# Patient Record
Sex: MALE | Race: WHITE | NOT HISPANIC OR LATINO | URBAN - METROPOLITAN AREA
[De-identification: names, ages, dates, MRNs, and addresses within clinical notes are randomized per-mention and may not be internally consistent; named-entity substitution may affect disease eponyms.]

---

## 2020-12-29 ENCOUNTER — NURSE TRIAGE (OUTPATIENT)
Dept: OTHER | Facility: OTHER | Age: 35
End: 2020-12-29

## 2021-05-16 ENCOUNTER — IMMUNIZATIONS (OUTPATIENT)
Dept: FAMILY MEDICINE CLINIC | Facility: HOSPITAL | Age: 36
End: 2021-05-16

## 2021-05-16 DIAGNOSIS — Z23 ENCOUNTER FOR IMMUNIZATION: Primary | ICD-10-CM

## 2021-05-16 PROCEDURE — 91300 SARS-COV-2 / COVID-19 MRNA VACCINE (PFIZER-BIONTECH) 30 MCG: CPT

## 2021-05-16 PROCEDURE — 0001A SARS-COV-2 / COVID-19 MRNA VACCINE (PFIZER-BIONTECH) 30 MCG: CPT

## 2021-06-10 ENCOUNTER — IMMUNIZATIONS (OUTPATIENT)
Dept: FAMILY MEDICINE CLINIC | Facility: HOSPITAL | Age: 36
End: 2021-06-10

## 2021-06-10 DIAGNOSIS — Z23 ENCOUNTER FOR IMMUNIZATION: Primary | ICD-10-CM

## 2021-06-10 PROCEDURE — 0002A SARS-COV-2 / COVID-19 MRNA VACCINE (PFIZER-BIONTECH) 30 MCG: CPT

## 2021-06-10 PROCEDURE — 91300 SARS-COV-2 / COVID-19 MRNA VACCINE (PFIZER-BIONTECH) 30 MCG: CPT

## 2023-01-16 ENCOUNTER — TELEPHONE (OUTPATIENT)
Dept: UROLOGY | Facility: AMBULATORY SURGERY CENTER | Age: 38
End: 2023-01-16

## 2023-01-16 NOTE — TELEPHONE ENCOUNTER
New Patient    What is the reason for the patient’s appointment? Vas consult     What office location does the patient prefer? [place]    Imaging/Lab Results:    Do we accept the patient's insurance or is the patient Self-Pay? Yes     Insurance Provider: Hermelinda Hatchet   Plan Type/Number:  Member ID#: Has the patient had any previous Urologist(s)? No     Have patient records been requested? If not are records showing in [address] [place] [address] Rd: records in [facility]     Has the patient had any outside testing done? No     Does the patient have a personal history of cancer?  No

## 2023-02-07 ENCOUNTER — OFFICE VISIT (OUTPATIENT)
Dept: UROLOGY | Facility: HOSPITAL | Age: 38
End: 2023-02-07

## 2023-02-07 VITALS
HEART RATE: 65 BPM | BODY MASS INDEX: 28.41 KG/M2 | HEIGHT: 67 IN | DIASTOLIC BLOOD PRESSURE: 62 MMHG | OXYGEN SATURATION: 97 % | SYSTOLIC BLOOD PRESSURE: 100 MMHG | WEIGHT: 181 LBS

## 2023-02-07 DIAGNOSIS — Z30.2 ENCOUNTER FOR STERILIZATION: Primary | ICD-10-CM

## 2023-02-07 NOTE — PROGRESS NOTES
2/7/2023    Bertin Bryan  1985  [id number]    VASECTOMY CONSULT     30 y o  male managed by new patient    1  Desire for elective sterilization  - exam today as below  - informed consent signed today  - continue contraception  - shave scrotal/pubic hair day prior to appt date    Return for vasectomy as scheduled  Instructed patient to call 1 week prior to his procedure date if he wishes to have a benzo prescribed  He understands he will need a ride to and from the office  All questions answered     History of Present Illness  Bertin Bryan is a 30 y o  male here for evaluation of VASECTOMY CONSULT    History of genitourinary or groin trauma or surgery- no   Fathered children- yes, 2   Personal and/or mutual desire for permanent sterility- mutual   Current contraceptive method- spouse has an IUD  Work/manual labor/lifting- No,    Voiding issues- none  Bleeding issues/thinners- none  Allergies to lidocaine/marcaine/betadine/chromic- SHELLFISH ALLERGY (facial swelling, anaplylaxis)     The patient presents requesting elective sterilization vasectomy  We discussed that vasectomy is in operation performed in the office in order to provide elective sterilization  This procedure should be considered a permanent option  Although there are subspecialists who perform vasectomy reversals, these operations are not 100% successful and are often not covered by insurance meaning they can come with a large out-of-pocket cost  The patient understands this  We reviewed the procedure in depth  Risk and benefits of the procedure were discussed and reviewed  Informed consent was obtained in the office today  The patient was prescribed a benzodiazepine to take one hour prior to the procedure to assist with his comfort  He understands that he will require transportation to and from the office that day if he is to use the benzodiazepine         He also understands he will require semen analysis testing at 8 weeks post procedure to ensure full sterilization  In the interim, he will require contraception during intercourse to avoid an undesired pregnancy  Usually, patients are out of work for 2-3 days  We recommend tight fitting scrotal support following the procedure along with ice packs applied to the scrotum 15 minutes on and 15 minutes off for the first 24 hours  We discussed that we do send the patient home with short course of anti-inflammatory and/or narcotic pain medication  After this discussion, the patient agrees to proceed  We will schedule him in the near future  Review of Systems   Constitutional: Negative for activity change, appetite change, chills and fever  HENT: Negative for congestion, ear pain and sore throat  Eyes: Negative for pain and visual disturbance  Respiratory: Negative for cough and shortness of breath  Cardiovascular: Negative for chest pain and palpitations  Gastrointestinal: Negative for abdominal distention, abdominal pain and vomiting  Endocrine: Negative  Genitourinary: Negative for decreased urine volume, difficulty urinating, dysuria, flank pain, frequency, hematuria, penile pain, penile swelling, scrotal swelling, testicular pain and urgency  Musculoskeletal: Negative for arthralgias and back pain  Skin: Negative for color change and rash  Allergic/Immunologic: Negative  Neurological: Negative for seizures and syncope  Hematological: Negative  Psychiatric/Behavioral: Negative  All other systems reviewed and are negative  Vitals  Vitals:    02/07/23 0940   BP: 100/62   BP Location: Left arm   Patient Position: Sitting   Cuff Size: Adult   Pulse: 65   SpO2: 97%   Weight: 82 1 kg (181 lb)   Height: 5' 7" (1 702 m)       Physical Exam  Vitals and nursing note reviewed  Constitutional:       General: He is not in acute distress  Appearance: Normal appearance  He is normal weight   He is not ill-appearing, toxic-appearing or diaphoretic  HENT:      Head: Normocephalic and atraumatic  Cardiovascular:      Rate and Rhythm: Normal rate and regular rhythm  Pulses: Normal pulses  Heart sounds: Normal heart sounds  Pulmonary:      Effort: Pulmonary effort is normal       Breath sounds: Normal breath sounds  Abdominal:      General: Bowel sounds are normal       Palpations: Abdomen is soft  Genitourinary:     Penis: Normal        Testes: Normal    Musculoskeletal:         General: Normal range of motion  Cervical back: Normal range of motion  Right lower leg: No edema  Left lower leg: No edema  Skin:     General: Skin is warm and dry  Neurological:      General: No focal deficit present  Mental Status: He is alert and oriented to person, place, and time  Mental status is at baseline  Psychiatric:         Mood and Affect: Mood normal          Behavior: Behavior normal          Thought Content: Thought content normal          Judgment: Judgment normal          General: Well appearing, no distress, appears stated age  HEENT:  Normocephalic, atraumatic  Conjunctiva clear  Respiratory: Nonlabored respirations, no wheeze or cough  Abdomen:  Soft nontender without hernia  No suprapubic or CVA tenderness  Genitourinary: Circumcised penis, normal phallus, orthotopic patent meatus  Testes smooth descended bilaterally into the scrotum nontender with no palpable mass  Palpably normal spermatic cord and vas deferens bilaterally  Musculoskeletal:  Normal range of motion and gait without defecit  Neuro: No gross neurologic defect or abnormality  Steady unassisted gait  Speech and affect normal   Dermatologic: skin warm, dry; no rash erythema or ecchymosis      Past History  History reviewed  No pertinent past medical history    Social History     Socioeconomic History   • Marital status: /Civil Union     Spouse name: None   • Number of children: None   • Years of education: None   • Highest education level: None   Occupational History   • None   Tobacco Use   • Smoking status: Never   • Smokeless tobacco: Never   Substance and Sexual Activity   • Alcohol use: Yes     Comment: Not often   • Drug use: Never   • Sexual activity: None   Other Topics Concern   • None   Social History Narrative   • None     Social Determinants of Health     Financial Resource Strain: Not on file   Food Insecurity: Not on file   Transportation Needs: Not on file   Physical Activity: Not on file   Stress: Not on file   Social Connections: Not on file   Intimate Partner Violence: Not on file   Housing Stability: Not on file     Social History     Tobacco Use   Smoking Status Never   Smokeless Tobacco Never     Family History   Problem Relation Age of Onset   • Cancer Maternal Uncle    • Prostate cancer Maternal Grandfather        The following portions of the patient's history were reviewed and updated as appropriate: allergies, current medications, past medical history, past social history, past surgical history and problem list     Results  No results found for this or any previous visit (from the past 1 hour(s))  ]  No results found for: PSA  No results found for: GLUCOSE, CALCIUM, NA, K, CO2, CL, BUN, CREATININE  No results found for: WBC, HGB, HCT, MCV, PLT

## 2023-09-13 ENCOUNTER — PROCEDURE VISIT (OUTPATIENT)
Dept: UROLOGY | Facility: MEDICAL CENTER | Age: 38
End: 2023-09-13
Payer: COMMERCIAL

## 2023-09-13 VITALS
HEIGHT: 67 IN | OXYGEN SATURATION: 98 % | HEART RATE: 61 BPM | DIASTOLIC BLOOD PRESSURE: 70 MMHG | BODY MASS INDEX: 27.47 KG/M2 | WEIGHT: 175 LBS | SYSTOLIC BLOOD PRESSURE: 120 MMHG

## 2023-09-13 DIAGNOSIS — Z30.2 ENCOUNTER FOR VASECTOMY: Primary | ICD-10-CM

## 2023-09-13 DIAGNOSIS — Z30.2 STERILIZATION: ICD-10-CM

## 2023-09-13 PROCEDURE — 88302 TISSUE EXAM BY PATHOLOGIST: CPT | Performed by: PATHOLOGY

## 2023-09-13 PROCEDURE — 55250 REMOVAL OF SPERM DUCT(S): CPT | Performed by: UROLOGY

## 2023-09-13 NOTE — PROGRESS NOTES
Patient presents for vasectomy. He had a consultation recently, all questions were answered, potential complications of the procedure were discussed. Patient expressed understanding of everything we talked about, and signed informed consent document. Before the procedure today,  I asked the patient if he  had any further questions that were not answered during the vasectomy consult. He had no questions and gave informed consent for the procedure. The patient was placed in lithotomy position and the scrotum was prepped with betadine solution. I ensured he was comfortable in lithotomy position, and sterile drapes were placed. The left scrotum was palpated and vas identified. The left vas was isolated, and 8-10 cc of 1% lidocaine was instilled in the skin above the vas, in the dartos muscle and the tissue around the vas. I checked for sensation and the left side was properly anesthetized. A small incision was made over the vas, and the tissue surrounding the vas was gently dissected with hemostat to further isolate the vas. An allis clamp was placed around the vas, and it was brought up into the incision. A small incision in the tissue immediately surrounding the vas was made,and  a small loop of vas was  from the surrounding tissue with a hemostat. A small segment of vas was removed with scissors. The inner lumen of each end of the cut vas was cauterized with bovie to scar the lumen. Each end of the vas was sealed with a hemoclip, taking care not to place the clip too hard, to prevent the clipped end from sloughing off. I checked for bleeding very carefully, used gentle bovie cautery as needed. I was careful not to disturb the arterial supply to the vas or the testicle. Inspection showed no significant bleeding. The vas and surrounding tissue were gently placed back into the scrotum. The right sided vasectomy was performed in identical fashion as in the above paragraph. After removing the segment of vas, cauterizing the lumen, clipping the ends, I checked carefully for bleeding, and there was none. Each incision was closed with a running suture of 4-0 chromic. There was no significant skin bleeding. A sterile dressing was applied and the patient's underwear held the bandage snugly. He was carefully raised to a sitting position and observed to ensure no orthostasis or dizziness. He was properly recovered from the procedure. I reviewed the post-op instructions again and gave him a copy of the instructions in writing. I again emphasized the importance of using birth control until a negative semen sample was obtained at 3 months. Before that he cannot  be considered infertile. He knows his partner must use birth control until that negative sample. He also knows there are rare cases of spontaneous reversal of the vasectomy, and absolutely no guarantee can be made of lifelong infertility. He knows that he can request another semen sample at anytime in the future if he wants further reassurance, although it is not necessary. He was escorted to the waiting room, and his  knew to take him right home. Before he left the procedure room I reviewed all the post-op instructions with him, and his said he understood them. He knows to keep the bandage on until the next day, and he can shower then. He knows to limit activities for several days and call us immediately if he has any concerns for bleeding, pain, infection, or any complication. The patient tolerated the procedure well and understood all instructions and exited the exam room in good condition.

## 2023-11-08 ENCOUNTER — APPOINTMENT (OUTPATIENT)
Dept: LAB | Facility: HOSPITAL | Age: 38
End: 2023-11-08
Payer: COMMERCIAL

## 2023-11-08 ENCOUNTER — OFFICE VISIT (OUTPATIENT)
Dept: LAB | Facility: HOSPITAL | Age: 38
End: 2023-11-08
Attending: UROLOGY
Payer: COMMERCIAL

## 2023-11-08 DIAGNOSIS — Z30.2 ENCOUNTER FOR VASECTOMY: ICD-10-CM

## 2023-11-08 LAB
DEPRECATED CD4 CELLS/CD8 CELLS BLD: 3 ML
SPERM MOTILE SMN QL MICRO: NORMAL

## 2023-11-08 PROCEDURE — 89321 SEMEN ANAL SPERM DETECTION: CPT
